# Patient Record
Sex: MALE
[De-identification: names, ages, dates, MRNs, and addresses within clinical notes are randomized per-mention and may not be internally consistent; named-entity substitution may affect disease eponyms.]

---

## 2018-03-12 ENCOUNTER — HOSPITAL ENCOUNTER (OUTPATIENT)
Dept: HOSPITAL 88 - RAD | Age: 73
End: 2018-03-12
Attending: UROLOGY
Payer: MEDICARE

## 2018-03-12 DIAGNOSIS — N20.0: Primary | ICD-10-CM

## 2018-03-12 PROCEDURE — 74018 RADEX ABDOMEN 1 VIEW: CPT

## 2018-03-12 NOTE — DIAGNOSTIC IMAGING REPORT
PROCEDURE:X-RAY ABDOMEN - KUB

 

COMPARISON:X-rays 12/14/17 and 9/8/2017.

 

INDICATIONS:CALCULUS OF THE KIDNEY

 

FINDINGS:

Bowel gas pattern: Normal. No dilated bowel loops.

 

Calcifications: There are 3 stable punctate calcifications in the lower 

pole of the right kidney. No calcifications over the left renal shadow. 

Several calcifications at the midline lower pelvis are stable.

 

Organomegaly: None

 

Bones:  Sclerotic lesion in the right iliac wing is stable. 

 

CONCLUSION:

 

Stable calcifications in the lower pole of the right kidney.   No new 

calcifications by x-ray.

 

 

Dictated by:  Isa Harrison M.D. on 3/12/2018 at 13:30     

Electronically approved by:  Isa Harrison M.D. on 3/12/2018 at 

13:30

## 2018-03-14 ENCOUNTER — HOSPITAL ENCOUNTER (EMERGENCY)
Dept: HOSPITAL 88 - ER | Age: 73
Discharge: ANOTHER HOSPITAL | End: 2018-03-14
Payer: COMMERCIAL

## 2018-03-14 VITALS — HEIGHT: 65 IN | BODY MASS INDEX: 32.49 KG/M2 | WEIGHT: 195 LBS

## 2018-03-14 DIAGNOSIS — E03.9: ICD-10-CM

## 2018-03-14 DIAGNOSIS — I60.8: Primary | ICD-10-CM

## 2018-03-14 DIAGNOSIS — I10: ICD-10-CM

## 2018-03-14 DIAGNOSIS — S02.40FA: ICD-10-CM

## 2018-03-14 DIAGNOSIS — E11.9: ICD-10-CM
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
## 2018-03-14 DIAGNOSIS — S00.03XA: ICD-10-CM

## 2018-03-14 DIAGNOSIS — Z85.850: ICD-10-CM

## 2018-03-14 DIAGNOSIS — S02.82XA: ICD-10-CM
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
## 2018-03-14 LAB
ALBUMIN SERPL-MCNC: 4.1 G/DL (ref 3.5–5)
ALBUMIN/GLOB SERPL: 1.3 {RATIO} (ref 0.8–2)
ALP SERPL-CCNC: 33 IU/L (ref 40–150)
ALT SERPL-CCNC: 23 IU/L (ref 0–55)
ANION GAP SERPL CALC-SCNC: 11.1 MMOL/L (ref 8–16)
BASOPHILS # BLD AUTO: 0 10*3/UL (ref 0–0.1)
BASOPHILS NFR BLD AUTO: 0.2 % (ref 0–1)
BUN SERPL-MCNC: 10 MG/DL (ref 7–26)
BUN/CREAT SERPL: 9 (ref 6–25)
CALCIUM SERPL-MCNC: 9.3 MG/DL (ref 8.4–10.2)
CHLORIDE SERPL-SCNC: 103 MMOL/L (ref 98–107)
CO2 SERPL-SCNC: 29 MMOL/L (ref 22–29)
DEPRECATED APTT PLAS QN: 26.1 SECONDS (ref 23.8–35.5)
DEPRECATED INR PLAS: 1.09
DEPRECATED NEUTROPHILS # BLD AUTO: 3 10*3/UL (ref 2.1–6.9)
EGFRCR SERPLBLD CKD-EPI 2021: > 60 ML/MIN (ref 60–?)
EOSINOPHIL # BLD AUTO: 0.1 10*3/UL (ref 0–0.4)
EOSINOPHIL NFR BLD AUTO: 2.1 % (ref 0–6)
ERYTHROCYTE [DISTWIDTH] IN CORD BLOOD: 14.3 % (ref 11.7–14.4)
GLOBULIN PLAS-MCNC: 3.2 G/DL (ref 2.3–3.5)
GLUCOSE SERPLBLD-MCNC: 229 MG/DL (ref 74–118)
HCT VFR BLD AUTO: 39.4 % (ref 38.2–49.6)
HGB BLD-MCNC: 13.3 G/DL (ref 14–18)
LYMPHOCYTES # BLD: 1.8 10*3/UL (ref 1–3.2)
LYMPHOCYTES NFR BLD AUTO: 33 % (ref 18–39.1)
MCH RBC QN AUTO: 26 PG (ref 28–32)
MCHC RBC AUTO-ENTMCNC: 33.8 G/DL (ref 31–35)
MCV RBC AUTO: 77.1 FL (ref 81–99)
MONOCYTES # BLD AUTO: 0.4 10*3/UL (ref 0.2–0.8)
MONOCYTES NFR BLD AUTO: 7.8 % (ref 4.4–11.3)
NEUTS SEG NFR BLD AUTO: 56.5 % (ref 38.7–80)
PLATELET # BLD AUTO: 242 X10E3/UL (ref 140–360)
POTASSIUM SERPL-SCNC: 4.1 MMOL/L (ref 3.5–5.1)
PROTHROMBIN TIME: 13.3 SECONDS (ref 11.9–14.5)
RBC # BLD AUTO: 5.11 X10E6/UL (ref 4.3–5.7)
SODIUM SERPL-SCNC: 139 MMOL/L (ref 136–145)

## 2018-03-14 PROCEDURE — 80053 COMPREHEN METABOLIC PANEL: CPT

## 2018-03-14 PROCEDURE — 99285 EMERGENCY DEPT VISIT HI MDM: CPT

## 2018-03-14 PROCEDURE — 72125 CT NECK SPINE W/O DYE: CPT

## 2018-03-14 PROCEDURE — 36415 COLL VENOUS BLD VENIPUNCTURE: CPT

## 2018-03-14 PROCEDURE — 70450 CT HEAD/BRAIN W/O DYE: CPT

## 2018-03-14 PROCEDURE — 70486 CT MAXILLOFACIAL W/O DYE: CPT

## 2018-03-14 PROCEDURE — 85610 PROTHROMBIN TIME: CPT

## 2018-03-14 PROCEDURE — 85730 THROMBOPLASTIN TIME PARTIAL: CPT

## 2018-03-14 PROCEDURE — 85025 COMPLETE CBC W/AUTO DIFF WBC: CPT

## 2018-03-14 NOTE — XMS REPORT
Patient Summary Document

 Created on: 2018



SELVIN PATEL

External Reference #: [id number]

: 1945

Sex: Male



Demographics







 Address  [address] [id number]

 

 Home Phone  [phone]

 

 Preferred Language  Unknown

 

 Marital Status  Unknown

 

 Tenriism Affiliation  Unknown

 

 Race  Unknown

 

 Additional Race(s)  

 

 Ethnic Group  Unknown





Author







 Author  [facility]

 

 Address  Unknown

 

 Phone  Unavailable







Care Team Providers







 Care Team Member Name  Role  Phone

 

 GIOVANNI WHITE  Unavailable  Unavailable







Problems

This patient has no known problems.



Allergies, Adverse Reactions, Alerts

This patient has no known allergies or adverse reactions.



Medications

This patient has no known medications.



Results







 Test Description  Test Time  Test Comments  Text Results  Atomic Results  
Result Comments









 ABDOMEN-1VIEW (KUB)            [place] [address]      Patient Name: SELVIN PATEL   MR #: [id number]    : 1945 Age/Sex: 72/M  Acct #: 
[id number] Req #: [id number]  Adm Physician:     Ordered by: GIOVANNI WHITE MD  Report #: [id number]   Location: [facility]  Room/Bed:     _________________________
__________________________________________________________________________    
Procedure: [id number] DX/ABDOMEN-1VIEW (KUB)  Exam Date: 18               
             Exam Time: 0939       REPORT STATUS: Signed    PROCEDURE:   X-RAY 
ABDOMEN - KUB       COMPARISON:   X-rays 17 and 2017.       
INDICATIONS:   CALCULUS OF THE KIDNEY       FINDINGS:      Bowel gas pattern: 
Normal. No dilated bowel loops.       Calcifications: There are 3 stable 
punctate calcifications in the lower    pole of the right kidney. No 
calcifications over the left renal shadow.    Several calcifications at the 
midline lower pelvis are stable.       Organomegaly: None       Bones:  
Sclerotic lesion in the right iliac wing is stable.        CONCLUSION:          
Stable calcifications in the lower pole of the right kidney.   No new    
calcifications by x-ray.           Dictated by:  Shanda Harrison M.D. on 3/12/
2018 at 13:30        Electronically approved by:  Shanda Harrison M.D. on 3/12/
2018 at    13:30                Dictated By: SHANDA HARRISON MD  
Electronically Signed By: SHANDA HARRISON MD on 18 1330  Transcribed By
: TIFFANI on 18 1330       COPY TO:   GIOVANNI WHITE MD           

 

 ABDOMEN-1VIEW (KUB)            [place] [address]      Patient Name: SELVIN PATEL   MR #: [id number]    : 1945 Age/Sex: 72/M  Acct #: 
[id number] Req #: [id number]  Adm Physician:     Ordered by: GIOVANNI WHITE MD  Report #: [id number]   Location: [facility]  Room/Bed:     _________________________
__________________________________________________________________________    
Procedure: [id number] DX/ABDOMEN-1VIEW (KUB)  Exam Date: 17               
             Exam Time: 0855       REPORT STATUS: Signed    PROCEDURE:   X-RAY 
ABDOMEN - KUB       COMPARISON:   2017       INDICATIONS:   CALCULUS OF 
KIDNEY       FINDINGS:      Unchanged appearance of a 2-3 mm calculus 
projecting over the lower    pole of the right renal shadow. No additional 
calcifications project    over the renal shadows, expected ureteral courses, or 
urinary bladder.    Midline low pelvic calcifications are likely prostatic in 
origin.    Unchanged sclerotic focus projecting over the right iliac wing, 
likely    a bone island. Regional skeletal structures are otherwise intact.    
Nonobstructive bowel gas pattern with gas and fecal material throughout    the 
rectum.           CONCLUSION:      Stable right lower pole nephrolithiasis.    
Dictated by:  Kylah Hand M.D. on 2017 at 9:35        Electronically 
approved by:  Kylah Hand M.D. on 2017 at 9:35                Dictated By
: KYLAH HAND MD  Electronically Signed By: KYLAH HAND MD on 17
  Transcribed By: TIFFANI on 17       COPY TO:   GIOVANNI WHITE MD    
       

 

 ABDOMEN-1VIEW (KUB)            [place] [address]      Patient Name: SELVIN PATEL   MR #: [id number]    : 1945 Age/Sex: 71/M  Acct #: 
[id number] Req #: [id number]  Adm Physician:     Ordered by: GIOVANNI WHITE MD  Report #: [id number]   Location: [facility]  Room/Bed:     _________________________
__________________________________________________________________________    
Procedure: [id number] DX/ABDOMEN-1VIEW (KUB)  Exam Date: 17               
             Exam Time: 0820       REPORT STATUS: Signed    PROCEDURE:   X-RAY 
ABDOMEN - KUB       COMPARISON:   17.       INDICATIONS:   RENAL CALCULUS 
      FINDINGS:      No appreciable interval change in cluster of small 
calcifications    projecting over the lower pole of the right kidney, the 
largest of    which measures 3 mm. No additional calcifications project over 
the    renal shadows, expected ureteral courses, or pelvis. Dystrophic    
prostatic calcifications. Sclerotic focus projecting over the right    iliac 
wing is unchanged and may represent a bone island. Regional    skeletal 
structures are otherwise intact. Bowel gas pattern is    nonobstructive.       
    CONCLUSION:      Unchanged right lower pole nephrolithiasis.        
Dictated by:  Kylah Hand M.D. on 2017 at 8:52        Electronically 
approved by:  Kylah Hand M.D. on 2017 at 8:52                Dictated By: 
KYLAH HAND MD  Electronically Signed By: KYLAH HAND MD on 17  
Transcribed By: TIFFANI on 17       COPY TO:   GIOVANNI WHITE MD
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
## 2018-07-13 ENCOUNTER — HOSPITAL ENCOUNTER (OUTPATIENT)
Dept: HOSPITAL 88 - RAD | Age: 73
End: 2018-07-13
Attending: UROLOGY
Payer: COMMERCIAL

## 2018-07-13 DIAGNOSIS — N20.0: Primary | ICD-10-CM

## 2018-07-13 PROCEDURE — 74018 RADEX ABDOMEN 1 VIEW: CPT

## 2018-07-13 NOTE — DIAGNOSTIC IMAGING REPORT
PROCEDURE:X-RAY ABDOMEN - KUB

 

COMPARISON:Patients [facility], DX, ABDOMEN-1VIEW (KUB), 

3/12/2018, 9:39.

 

INDICATIONS:CALCULUS OF KIDNEY

 

FINDINGS:

Lung bases: Clear.

Bowel: Normal bowel gas pattern. No dilated bowel loops.

Calcifications: 2-3 tiny calcifications overlying the lower pole of the 

right kidney are stable. No calcifications over the left renal shadow 

or along the expected course of the ureters. 

Bones/soft tissues: Unremarkable.

 

CONCLUSION:

 

Right intrarenal calculi as described above. No new calculus has 

developed.

 

Dictated by:  Isa Harrison M.D. on 7/13/2018 at 12:18     

Electronically approved by:  Isa Harrison M.D. on 7/13/2018 at 

12:18

## 2018-12-06 ENCOUNTER — HOSPITAL ENCOUNTER (OUTPATIENT)
Dept: HOSPITAL 88 - LAB | Age: 73
End: 2018-12-06
Attending: INTERNAL MEDICINE
Payer: MEDICARE

## 2018-12-06 DIAGNOSIS — Z12.5: Primary | ICD-10-CM

## 2018-12-06 DIAGNOSIS — I42.9: ICD-10-CM

## 2018-12-06 DIAGNOSIS — R68.89: ICD-10-CM

## 2018-12-06 DIAGNOSIS — R94.6: ICD-10-CM

## 2018-12-06 DIAGNOSIS — E78.5: ICD-10-CM

## 2018-12-06 DIAGNOSIS — R80.9: ICD-10-CM

## 2018-12-06 DIAGNOSIS — R73.09: ICD-10-CM

## 2018-12-06 LAB
ALBUMIN SERPL-MCNC: 3.7 G/DL (ref 3.5–5)
ALBUMIN/GLOB SERPL: 1 {RATIO} (ref 0.8–2)
ALP SERPL-CCNC: 38 IU/L (ref 40–150)
ALT SERPL-CCNC: 17 IU/L (ref 0–55)
ANION GAP SERPL CALC-SCNC: 13.8 MMOL/L (ref 8–16)
BASOPHILS # BLD AUTO: 0 10*3/UL (ref 0–0.1)
BASOPHILS NFR BLD AUTO: 0.3 % (ref 0–1)
BUN SERPL-MCNC: 10 MG/DL (ref 7–26)
BUN/CREAT SERPL: 11 (ref 6–25)
CALCIUM SERPL-MCNC: 9.4 MG/DL (ref 8.4–10.2)
CHLORIDE SERPL-SCNC: 103 MMOL/L (ref 98–107)
CHOLEST SERPL-MCNC: 177 MD/DL (ref 0–199)
CHOLEST/HDLC SERPL: 5.1 {RATIO} (ref 3.9–4.7)
CO2 SERPL-SCNC: 26 MMOL/L (ref 22–29)
DEPRECATED NEUTROPHILS # BLD AUTO: 3.6 10*3/UL (ref 2.1–6.9)
EGFRCR SERPLBLD CKD-EPI 2021: > 60 ML/MIN (ref 60–?)
EOSINOPHIL # BLD AUTO: 0.3 10*3/UL (ref 0–0.4)
EOSINOPHIL NFR BLD AUTO: 3.9 % (ref 0–6)
ERYTHROCYTE [DISTWIDTH] IN CORD BLOOD: 14.6 % (ref 11.7–14.4)
GLOBULIN PLAS-MCNC: 3.6 G/DL (ref 2.3–3.5)
GLUCOSE SERPLBLD-MCNC: 184 MG/DL (ref 74–118)
HCT VFR BLD AUTO: 42.4 % (ref 38.2–49.6)
HDLC SERPL-MSCNC: 35 MG/DL (ref 40–60)
HGB BLD-MCNC: 13.6 G/DL (ref 14–18)
LDLC SERPL CALC-MCNC: 103 MG/DL (ref 60–130)
LYMPHOCYTES # BLD: 2.1 10*3/UL (ref 1–3.2)
LYMPHOCYTES NFR BLD AUTO: 32.4 % (ref 18–39.1)
MCH RBC QN AUTO: 25 PG (ref 28–32)
MCHC RBC AUTO-ENTMCNC: 32.1 G/DL (ref 31–35)
MCV RBC AUTO: 77.9 FL (ref 81–99)
MONOCYTES # BLD AUTO: 0.4 10*3/UL (ref 0.2–0.8)
MONOCYTES NFR BLD AUTO: 6.4 % (ref 4.4–11.3)
NEUTS SEG NFR BLD AUTO: 56.5 % (ref 38.7–80)
PLATELET # BLD AUTO: 255 X10E3/UL (ref 140–360)
POTASSIUM SERPL-SCNC: 3.8 MMOL/L (ref 3.5–5.1)
RBC # BLD AUTO: 5.44 X10E6/UL (ref 4.3–5.7)
SODIUM SERPL-SCNC: 139 MMOL/L (ref 136–145)
TRIGL SERPL-MCNC: 196 MG/DL (ref 0–149)
TSH SERPL DL<=0.005 MIU/L-ACNC: 1.61 UIU/ML (ref 0.35–4.94)

## 2018-12-06 PROCEDURE — 84152 ASSAY OF PSA COMPLEXED: CPT

## 2018-12-06 PROCEDURE — 85025 COMPLETE CBC W/AUTO DIFF WBC: CPT

## 2018-12-06 PROCEDURE — 80061 LIPID PANEL: CPT

## 2018-12-06 PROCEDURE — 83036 HEMOGLOBIN GLYCOSYLATED A1C: CPT

## 2018-12-06 PROCEDURE — 82044 UR ALBUMIN SEMIQUANTITATIVE: CPT

## 2018-12-06 PROCEDURE — 36415 COLL VENOUS BLD VENIPUNCTURE: CPT

## 2018-12-06 PROCEDURE — 84443 ASSAY THYROID STIM HORMONE: CPT

## 2018-12-06 PROCEDURE — 80053 COMPREHEN METABOLIC PANEL: CPT

## 2019-02-20 ENCOUNTER — HOSPITAL ENCOUNTER (OUTPATIENT)
Dept: HOSPITAL 88 - CT | Age: 74
End: 2019-02-20
Attending: UROLOGY
Payer: MEDICARE

## 2019-02-20 DIAGNOSIS — N20.0: ICD-10-CM

## 2019-02-20 DIAGNOSIS — K76.0: ICD-10-CM

## 2019-02-20 DIAGNOSIS — R31.0: Primary | ICD-10-CM

## 2019-02-20 PROCEDURE — 74176 CT ABD & PELVIS W/O CONTRAST: CPT

## 2019-02-20 NOTE — DIAGNOSTIC IMAGING REPORT
EXAM: CT ABDOMEN AND PELVIS without IV CONTRAST

DATE: 2/20/2019 Time stamp on Exam: 9:37 AM

INDICATION:  Hematuria

COMPARISON:  None 

TECHNIQUE: The abdomen and pelvis were scanned using a multidetector helical

scanner. Coronal and sagittal reformations were obtained. Routine protocol

performed.



Low-dose technique was utilized.



IV Contrast: None

Oral Contrast: None

Radiation Dose: Total .08 mGy*cm

Estimated effective dose: DLP x 0.015 x size factor



FINDINGS:

LOWER THORAX: No consolidations



LIVER: No masses with diffuse fatty infiltration of the liver.

BILIARY: The gallbladder is unremarkable.  No ductal dilatation.



SPLEEN: No masses

PANCREAS: No masses



ADRENALS: No nodules

KIDNEYS: There are 2 small right lower pole renal stones with a composite

measurement of 6 mm. No evidence of hydronephrosis. Contour abnormality of the

left upper interpolar region with subtle low density may represent a cyst or a

mass. In a patient with gross hematuria CT renal mass protocol follow-up would

be of benefit. Nonspecific bilateral perinephric fat stranding.



GI TRACT: No distention, wall thickening or evidence of obstruction.    



VESSELS: Vascular calcification.

PERITONEUM/RETROPERITONEUM: No free air or fluid

LYMPH NODES: No lymphadenopathy



REPRODUCTIVE ORGANS: Prostate calcification.

BLADDER: Unremarkable



SOFT TISSUES: Unremarkable

BONES: No suspicious bone lesions. Mild degenerative changes of the spine.



IMPRESSION:

1. Two small nonobstructing right lower pole renal stones.

2. Subtle left lateral renal interpolar contour abnormality with focal

hypodensity.

3. Follow-up renal mass protocol is recommended.

4. Diffuse fatty infiltration of the liver.



Signed by: Dr. Deni Johnson DO on 2/20/2019 10:43 AM

## 2019-03-05 ENCOUNTER — HOSPITAL ENCOUNTER (OUTPATIENT)
Dept: HOSPITAL 88 - US | Age: 74
End: 2019-03-05
Attending: OTOLARYNGOLOGY
Payer: MEDICARE

## 2019-03-05 DIAGNOSIS — C73: Primary | ICD-10-CM

## 2019-03-05 PROCEDURE — 76536 US EXAM OF HEAD AND NECK: CPT

## 2019-03-05 PROCEDURE — 71046 X-RAY EXAM CHEST 2 VIEWS: CPT

## 2019-03-05 NOTE — DIAGNOSTIC IMAGING REPORT
EXAMINATION: PA and lateral views of the chest.



COMPARISON: None



CLINICAL HISTORY:  Thyroid cancer

     

DISCUSSION:



Lines/tubes:  None.



Lungs:  The lungs are well inflated and clear. No pneumonia or pulmonary edema.



Pleura:  No pleural effusion or pneumothorax.



Heart and mediastinum:  The cardiomediastinal silhouette is normal.



Bones and soft tissues:  No acute bony abnormalities.     



IMPRESSION: 



No acute cardiopulmonary abnormalities.











Signed by: Dr. Andrew Palisch, M.D. on 3/5/2019 3:14 PM

## 2019-03-05 NOTE — DIAGNOSTIC IMAGING REPORT
EXAM: Thyroid Ultrasound

INDICATION:        ^THYROID CANCER. Thyroidectomy 2003. 

COMPARISON: None 

TECHNIQUE: Transverse and sagittal images were obtained of the thyroid bed. 



FINDINGS:



Thyroid bed:

Status post thyroidectomy. No residual thyroid tissue or recurrent thyroid

tissue is noted in the thyroid bed bilaterally.  



Lymph nodes:

No suspicious appearing lymph nodes.  



IMPRESSION:   

Status post thyroidectomy. No evidence to suggest residual or recurrent

disease.



Signed by: Dr. Joselito Funes M.D. on 3/5/2019 5:22 PM

## 2019-03-11 ENCOUNTER — HOSPITAL ENCOUNTER (OUTPATIENT)
Dept: HOSPITAL 88 - CT | Age: 74
End: 2019-03-11
Attending: UROLOGY
Payer: MEDICARE

## 2019-03-11 DIAGNOSIS — D41.00: Primary | ICD-10-CM

## 2019-03-11 LAB
BUN SERPL-MCNC: 17 MG/DL (ref 7–26)
BUN/CREAT SERPL: 17 (ref 6–25)
EGFRCR SERPLBLD CKD-EPI 2021: > 60 ML/MIN (ref 60–?)

## 2019-03-11 PROCEDURE — 74178 CT ABD&PLV WO CNTR FLWD CNTR: CPT

## 2019-03-11 PROCEDURE — 82565 ASSAY OF CREATININE: CPT

## 2019-03-11 PROCEDURE — 84520 ASSAY OF UREA NITROGEN: CPT

## 2019-03-11 PROCEDURE — 36415 COLL VENOUS BLD VENIPUNCTURE: CPT

## 2019-03-11 NOTE — DIAGNOSTIC IMAGING REPORT
EXAM: CT Abdomen and Pelvis WITHOUT and WITH contrast  

INDICATION: Renal neoplasm

COMPARISON: CT abdomen/pelvis, 2/20/2019

TECHNIQUE:  Abdomen and pelvis were scanned utilizing a multidetector helical

scanner from the lung base to the pubic symphysis before and after

administration of IV contrast. Coronal and sagittal reformations were obtained.

Imaging was obtained precontrast, arterial phase, venous phase and delayed

phase. Renal mass protocol was performed, with abdomen and pelvis scanning on

delayed imaging.



Dose modulation, iterative reconstruction, and/or weight based adjustment of

the mA/kV was utilized to reduce the radiation dose to as low as reasonably

achievable. 



            IV CONTRAST: 100 mL of Isovue-370

            ORAL CONTRAST: 900 cc water

            RADIATION DOSE: Total DLP: 1803.90 mGy*cm

             Estimated effective dose: (DLP x 0.015 x size factor) mSv

            COMPLICATIONS: None



FINDINGS:



LINES and TUBES: None.



LOWER THORAX:  Lung bases clear. Heart size normal.



HEPATOBILIARY: Diffuse low density hepatic parenchyma compatible with

steatosis.  No focal hepatic lesions. No biliary ductal dilation. 



GALLBLADDER: No radio-opaque stones or sludge.  No wall thickening.



SPLEEN: No splenomegaly. 



PANCREAS: No focal masses or ductal dilatation.  



ADRENALS: No adrenal nodules    



KIDNEYS/URETERS: Kidneys enhance symmetrically.  No hydronephrosis or ureteral

dilatation no filling defects in opacified collecting systems. There is a well

marginated 1.1 cm peripheral low density mass in the lateral upper aspect of

the left kidney. This is best seen on postcontrast imaging. The internal

density measures under 10 HU on each phase of the examination with no evidence

for enhancement.  Again noted is a 5 mm calcification in the lower pole right

collecting system compatible with nonobstructing intrarenal calculus. There is

very mild perinephric stranding bilaterally.



GI TRACT: No abnormal distention, wall thickening, or evidence of bowel

obstruction.  There are scattered colonic diverticula with no CT evidence for

acute diverticulitis.  The appendix is not conspicuously visualized but there

is no right lower quadrant inflammation to suggest appendicitis.



PELVIC ORGANS/BLADDER: Partially opacified urinary bladder unremarkable. No

discrete abnormal mass or fluid collection in the pelvis.



LYMPH NODES: No dominant lymph node mass is seen in the abdomen,

retroperitoneum or pelvis.



VESSELS: There are scattered calcified plaques along the abdominal aorta. No

abdominal aortic aneurysm or dissection. Renal arteries are patent with an

accessory left renal artery noted.  IVC unremarkable. Portal system is patent,

only partially included on the venous phase which is targeted at the kidneys.



PERITONEUM / RETROPERITONEUM: No pneumoperitoneum or ascites.



BONES: No acute or suspicious bony lesions.



SOFT TISSUES: Superficial surrounding soft tissue unremarkable.   Small left

inguinal hernia containing fat.      



IMPRESSION: 

1.  1.1 cm hypodensity in the lateral upper pole of the left kidney is cyst

density, well marginated and shows no evidence for enhancement. This is

compatible with a small cortical cyst.



2.  Again noted is a 5 mm nonobstructing intrarenal calculus the lower pole of

the right collecting system.



3.  No other acute finding or significant change from previous CT.



Signed by: Dr. Farhan Nolasco M.D. on 3/11/2019 10:37 AM

## 2019-04-25 ENCOUNTER — HOSPITAL ENCOUNTER (OUTPATIENT)
Dept: HOSPITAL 88 - RAD | Age: 74
End: 2019-04-25
Attending: UROLOGY
Payer: MEDICARE

## 2019-04-25 DIAGNOSIS — N20.0: Primary | ICD-10-CM

## 2019-04-25 PROCEDURE — 74018 RADEX ABDOMEN 1 VIEW: CPT

## 2019-04-25 NOTE — DIAGNOSTIC IMAGING REPORT
Exam: KUB - 2 views



Clinical History: Renal calculus.



Comparison: CT abdomen/pelvis 3/11/2019.



Findings: 

There is a 5 mm calcification overlying the right lower pole kidney. No

evidence of calcification overlying the left kidney or expected course of the

ureters. There are prostatic calcifications.



Nonobstructive bowel gas pattern. Moderate amount of stool in the colon.



No acute bony abnormality.



Impression:

A 5 mm calcification overlying the right lower pole kidney, corresponding to

stone seen on prior CT from 3/11/2019.



Signed by: Dr. Agnieszka Linares MD on 4/25/2019 9:25 AM